# Patient Record
Sex: MALE | Race: BLACK OR AFRICAN AMERICAN | URBAN - METROPOLITAN AREA
[De-identification: names, ages, dates, MRNs, and addresses within clinical notes are randomized per-mention and may not be internally consistent; named-entity substitution may affect disease eponyms.]

---

## 2023-05-04 ENCOUNTER — EMERGENCY (EMERGENCY)
Facility: HOSPITAL | Age: 73
LOS: 1 days | Discharge: HOME | End: 2023-05-04
Attending: EMERGENCY MEDICINE | Admitting: EMERGENCY MEDICINE
Payer: COMMERCIAL

## 2023-05-04 VITALS
OXYGEN SATURATION: 98 % | DIASTOLIC BLOOD PRESSURE: 67 MMHG | SYSTOLIC BLOOD PRESSURE: 159 MMHG | TEMPERATURE: 98 F | RESPIRATION RATE: 16 BRPM | HEART RATE: 86 BPM

## 2023-05-04 DIAGNOSIS — M25.511 PAIN IN RIGHT SHOULDER: ICD-10-CM

## 2023-05-04 DIAGNOSIS — M25.552 PAIN IN LEFT HIP: ICD-10-CM
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
## 2023-05-04 DIAGNOSIS — M25.551 PAIN IN RIGHT HIP: ICD-10-CM

## 2023-05-04 DIAGNOSIS — Z91.013 ALLERGY TO SEAFOOD: ICD-10-CM
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
## 2023-05-04 DIAGNOSIS — I10 ESSENTIAL (PRIMARY) HYPERTENSION: ICD-10-CM

## 2023-05-04 PROCEDURE — 99053 MED SERV 10PM-8AM 24 HR FAC: CPT

## 2023-05-04 PROCEDURE — 99285 EMERGENCY DEPT VISIT HI MDM: CPT

## 2023-05-04 NOTE — ED ADULT TRIAGE NOTE - CHIEF COMPLAINT QUOTE
Pt BIBA c/o mechanical fall. Endorsing R shoulder pain, bilateral hip pain, and head pain. Denies loc.

## 2023-05-05 VITALS
DIASTOLIC BLOOD PRESSURE: 82 MMHG | TEMPERATURE: 98 F | SYSTOLIC BLOOD PRESSURE: 148 MMHG | OXYGEN SATURATION: 97 % | RESPIRATION RATE: 18 BRPM | HEART RATE: 74 BPM

## 2023-05-05 PROCEDURE — 73521 X-RAY EXAM HIPS BI 2 VIEWS: CPT | Mod: 26

## 2023-05-05 PROCEDURE — 73030 X-RAY EXAM OF SHOULDER: CPT | Mod: 26,RT

## 2023-05-05 PROCEDURE — 71045 X-RAY EXAM CHEST 1 VIEW: CPT | Mod: 26

## 2023-05-05 PROCEDURE — 72125 CT NECK SPINE W/O DYE: CPT | Mod: 26

## 2023-05-05 PROCEDURE — 70450 CT HEAD/BRAIN W/O DYE: CPT | Mod: 26

## 2023-05-05 RX ORDER — ACETAMINOPHEN 500 MG
975 TABLET ORAL ONCE
Refills: 0 | Status: COMPLETED | OUTPATIENT
Start: 2023-05-05 | End: 2023-05-05

## 2023-05-05 RX ADMIN — Medication 975 MILLIGRAM(S): at 03:35

## 2023-05-05 NOTE — ED ADULT NURSE NOTE - PATIENT IS UNABLE TO BE SCREENED DUE TO:
Additional Eyelid Protection Text (For Upper And Lower Eyelids And Eyelid Creping Only): The orbit was anesthetized with two drops of tetracaine hydrochloride ophthalmic solution 0.5% (sterile). A properly sized sterile intra-ocular laser corneal shield cushioned with ophthalmic ointment was carefully placed. Upon completion of the procedure the corneal shield was carefully removed to avoid irritation of the cornea and the eye was irrigated with sterile basic saline solution. Patient refused

## 2023-05-05 NOTE — ED PROVIDER NOTE - CARE PLAN
1 Principal Discharge DX:	Fall  Secondary Diagnosis:	Right shoulder pain  Secondary Diagnosis:	Bilateral hip pain

## 2023-05-05 NOTE — ED ADULT NURSE NOTE - OBJECTIVE STATEMENT
73y male presents to ED c/o mechanical fall. +head injury. Pt endorsing R shoulder pain and bilateral hip pain.

## 2023-05-05 NOTE — ED PROVIDER NOTE - PHYSICAL EXAMINATION
PE: well appearing, NAD, NCAT, RRR, CTAB, abd soft ntnd, neuro grossly intact, PERRL, oropharynx clear

## 2023-05-05 NOTE — ED PROVIDER NOTE - NSFOLLOWUPINSTRUCTIONS_ED_ALL_ED_FT
No acute bony injury or signs of brain bleed identified on your imaging today. Please read all handouts provided to you from the emergency department.  Seek immediate medical attention for any new/worsening signs or symptoms.  You may take ibuprofen 600 mg every 6 hours and/or acetaminophen 650 mg every 4-6 hours as needed for pain.  Please follow up with your Primary Care Physician. If you do not have a doctor, you can call our referral line ([phone]) to find a doctor that matches your insurance.     You can also follow up with clinics listed below if you do not have a doctor:  [address] [address]  Appointment Center:  [phone]    [address] [address]  Appointment Center: [phone] ([phone])       Am I at risk of falling?  Falls can happen to anyone, no matter how old they are. Several things can increase your risk of a fall, including:    ?Vision problems    ?Having certain chronic health conditions, being sick, having recently been in the hospital, or having had surgery    ?Changing the medicines that you take    ?An unsafe or unfamiliar setting (for example, a room with rugs or furniture that might trip you, or an area that you don't know well)    Your risk of falling increases as you grow older. That's because getting older can make it harder to walk steadily and keep your balance. Also, the effects of falls are more serious in older people.    Overall, 3 to 4 out of every 10 people over the age of 65 fall each year. Many people who fracture a hip never fully recover to how they were before the fracture. If you have fallen in the past, you are at higher risk of falling again.    How can my doctor help me avoid falling?  Your doctor can talk to you about the following things:    ?Past falls – It is important to tell your doctor about any times that you have fallen or almost fallen. They can then suggest ways to prevent another fall.    ?Your health conditions – Some health problems can put you at risk of falling. These include conditions that affect eyesight, hearing, muscle strength, or balance.    ?What to do if you need to be in the hospital – Falls can happen in the hospital because you are in an unfamiliar place. You might feel the effects of medicines or anesthesia that make you unsteady or drowsy. Or you might be attached to catheters or IV tubing that you might trip over. You are also likely to be weaker than usual.    ?The medicines that you take – Certain medicines can increase the risk of falling. These include some medicines that are used for sleeping problems, anxiety, high blood pressure, or depression. Adding new medicines, or changing doses of some medicines, can also affect your risk of falling.    The more your doctor knows about your situation, the better they will be able to help you. For example, if you fell because you have a condition that causes pain, your doctor might suggest treatments to deal with the pain. Or if one of your medicines is making you dizzy and more likely to fall, your doctor might switch you to a different medicine.    Is there anything I can do on my own?  Yes. To help keep from falling, you can:    ?Make your home safer – To avoid falling at home, get rid of things that might make you trip or slip. This might include furniture, electrical cords, clutter, and loose rugs (figure 1). Keep your home well-lit so that you can easily see where you are going. Avoid storing things in high places so you don't have to reach or climb.    ?Wear non-slip socks or sturdy shoes that fit well – Wearing shoes with high heels or slippery soles, or shoes that are too loose, can lead to falls. Walking around in bare feet, or only socks, can also increase your risk of falling.    ?Take vitamin D pills – Taking vitamin D might lower the risk of falls in older people. This is because vitamin D helps make bones and muscles stronger. Your doctor can talk to you about whether you should take extra vitamin D, and how much.    ?Stay active – Moving your body regularly can help lower your risk of falling. It might also help prevent you from getting hurt if you do fall. It is best to do a few different activities that help with both strength and balance. There are many kinds of exercise that can be safe for older people. These include walking, swimming, and francisco chi (a Chinese martial art that involves slow, gentle movements).    ?Use a cane, walker, or other safety devices – If your doctor recommends that you use a cane or walker, be sure that it's the right size and you know how to use it. There are other devices that might help you avoid falling, too. These include grab bars or a sturdy seat for the shower, non-slip bath mats, and hand rails or treads for the stairs (to prevent slipping).    ?Take extra care if you have had surgery or are in the hospital – Ask for help with getting out of bed, getting up from a chair, or going to the bathroom. Your body needs time to heal, and it's normal to need help with these things while you recover. It can also help to keep your belongings within reach, and avoid walking around in the dark. If you need help, use the call button instead of trying to get up.    If you worry that you could fall, there are also alarm buttons that let you call for help if you fall and can't get up.    What should I do if I fall?  If you fall, see your doctor right away, even if you aren't hurt. Your doctor can try to figure out what caused you to fall, and how likely you are to fall again. They will do an exam and talk to you about your health problems, medicines, and activities. They can also check how well you walk, move, and balance. Then, they can suggest things that you can do to lower your risk of falling again.    Many older people have a hard time recovering after a fall. Doing things to prevent falling can help you to protect your health and independence.

## 2023-05-05 NOTE — ED PROVIDER NOTE - OBJECTIVE STATEMENT
73M history of HTN presents sp slip and fall on the wet ground from the rain onto his right side; denies head strike, LOC, or AC use. Sustained injury to the R shoulder and b/l hips from the fall. R should has pain to the posterior aspect, worse with movement, aching, and moderate. Hip pains are b/l, aching, not aggravated with movement. Denies chest pain, shortness of breath, abdominal pain, nausea/vomiting, fevers/chills, bowel/bladder changes.

## 2023-05-05 NOTE — ED PROVIDER NOTE - CLINICAL SUMMARY MEDICAL DECISION MAKING FREE TEXT BOX
73M history of HTN presents sp slip and fall on the wet ground from the rain onto his right side; denies head strike, LOC, or AC use. Sustained injury to the R shoulder and b/l hips from the fall. R should has pain to the posterior aspect, worse with movement, aching, and moderate. Hip pains are b/l, aching, not aggravated with movement. Denies chest pain, shortness of breath, abdominal pain, nausea/vomiting, fevers/chills, bowel/bladder changes.     PE: well appearing, NAD, NCAT, RRR, CTAB, abd soft ntnd, neuro grossly intact, PERRL, oropharynx clear    MDM: Patient with mechanical slip and fall from wet ground with pain to the R shoulder and hips. No obvious deformities or limited ROM on physical exam. Will assess further with imaging including CT of the head and give acetaminophen for pain.

## 2023-05-05 NOTE — ED ADULT NURSE NOTE - BREATHING, MLM
[Time Spent: ___ minutes] : I have spent [unfilled] minutes of time on the encounter.
Spontaneous, unlabored and symmetrical

## 2023-05-05 NOTE — ED PROVIDER NOTE - PATIENT PORTAL LINK FT
You can access the FollowMyHealth Patient Portal offered by [facility] by registering at the following website: [email or web address]. By joining [employer]’s FollowMyHealth portal, you will also be able to view your health information using other applications (apps) compatible with our system.
